# Patient Record
(demographics unavailable — no encounter records)

---

## 2025-03-04 NOTE — ASSESSMENT
[FreeTextEntry1] : We will do routine blood work in the form of CBC, CMP, A1c, lipid, TSH, B12 folate at this point.  she may follow-up earlier if needed Pt was told to call with problems or concerns. The patient was told to seek immediate attention by calling 911 or going to the ER if her condition does not improve or gets acutely worse.  Patient states she will typically does have low blood pressure. Blood pressure 92 systolic today.  States this is in line with her prior BP readings. Recommending adequate hydration and adequate nutrition at this point. Patient denies any orthostatic hypotensive symptoms...

## 2025-03-04 NOTE — HISTORY OF PRESENT ILLNESS
[FreeTextEntry1] : check up [de-identified] : 22-year-old female is here for checkup.  This is my first time seeing this patient.   Patient denies any fevers, chills, nausea, vomiting, diarrhea, constipation, chest pain, shortness of breath, weakness, numbness, tingling at this time.  States she saw her last PCP was in [place]. States she was in [place] for 2 to 3 years before coming back to [place] in December.  Patient reports that she has had 3 COVID vaccines in the past.   Alcohol: Endorses. Patient reports that they drink alcohol occasionally (once a month). Smoking: Denies. Patient reports that they have never smoked cigarettes. Illicit Drugs: Denies. Patient reports that they do not use any recreational drugs. PAP smear:  Patient reports that they never had a PAP smear.  Patient declined to undergo Pap smear at this point.  Refused to undergo Pap smear at this point.  Patient declined OB/GYN referral on today's visit. Diet: Patient reports that they are not good with diet Exercise: Patient reports that they are not that with exercise Living Situation: Family.  Patient reports that they live with mother. Employment Status: Unemployed.  Patient reports that they are Unemployed at this point.  Education: Reports that the highest level of education is High school and currently in 4th year of College. Relationship Status: Single.  Number of kids : 0 ADLs: Fully functional (bathing, dressing, toileting, transferring, walking, feeding).  Patient reports that they can perform ADLs IADLs: Fully functional and needs no help or supervision to perform IADLs (using the telephone, shopping, preparing meals, housekeeping, doing laundry, using transportation, managing medications and managing finances).  Patient reports that they can perform IADLs.

## 2025-03-04 NOTE — HISTORY OF PRESENT ILLNESS
Franky Saucedo/Labbri [FreeTextEntry1] : check up [de-identified] : 22-year-old female is here for checkup.  This is my first time seeing this patient.   Patient denies any fevers, chills, nausea, vomiting, diarrhea, constipation, chest pain, shortness of breath, weakness, numbness, tingling at this time.  States she saw her last PCP was in [place]. States she was in [place] for 2 to 3 years before coming back to [place] in December.  Patient reports that she has had 3 COVID vaccines in the past.   Alcohol: Endorses. Patient reports that they drink alcohol occasionally (once a month). Smoking: Denies. Patient reports that they have never smoked cigarettes. Illicit Drugs: Denies. Patient reports that they do not use any recreational drugs. PAP smear:  Patient reports that they never had a PAP smear.  Patient declined to undergo Pap smear at this point.  Refused to undergo Pap smear at this point.  Patient declined OB/GYN referral on today's visit. Diet: Patient reports that they are not good with diet Exercise: Patient reports that they are not that with exercise Living Situation: Family.  Patient reports that they live with mother. Employment Status: Unemployed.  Patient reports that they are Unemployed at this point.  Education: Reports that the highest level of education is High school and currently in 4th year of College. Relationship Status: Single.  Number of kids : 0 ADLs: Fully functional (bathing, dressing, toileting, transferring, walking, feeding).  Patient reports that they can perform ADLs IADLs: Fully functional and needs no help or supervision to perform IADLs (using the telephone, shopping, preparing meals, housekeeping, doing laundry, using transportation, managing medications and managing finances).  Patient reports that they can perform IADLs.